# Patient Record
Sex: FEMALE | Race: BLACK OR AFRICAN AMERICAN | Employment: FULL TIME | URBAN - METROPOLITAN AREA
[De-identification: names, ages, dates, MRNs, and addresses within clinical notes are randomized per-mention and may not be internally consistent; named-entity substitution may affect disease eponyms.]

---

## 2017-05-04 ENCOUNTER — OFFICE VISIT (OUTPATIENT)
Dept: OBGYN CLINIC | Facility: CLINIC | Age: 37
End: 2017-05-04

## 2017-05-04 VITALS
HEIGHT: 63 IN | DIASTOLIC BLOOD PRESSURE: 80 MMHG | WEIGHT: 237 LBS | BODY MASS INDEX: 41.99 KG/M2 | SYSTOLIC BLOOD PRESSURE: 120 MMHG

## 2017-05-04 DIAGNOSIS — Z01.419 WOMEN'S ANNUAL ROUTINE GYNECOLOGICAL EXAMINATION: ICD-10-CM

## 2017-05-04 DIAGNOSIS — Z12.4 ROUTINE CERVICAL SMEAR: Primary | ICD-10-CM

## 2017-05-04 PROCEDURE — 87624 HPV HI-RISK TYP POOLED RSLT: CPT | Performed by: OBSTETRICS & GYNECOLOGY

## 2017-05-04 PROCEDURE — 88175 CYTOPATH C/V AUTO FLUID REDO: CPT | Performed by: OBSTETRICS & GYNECOLOGY

## 2017-05-04 PROCEDURE — 99395 PREV VISIT EST AGE 18-39: CPT | Performed by: OBSTETRICS & GYNECOLOGY

## 2017-05-04 NOTE — PROGRESS NOTES
GYN H&P     2017  12:30 PM    CC:Patient presents for routine visit    HPI: Patient is a 39year old  here for annual gyn exam and PAP. Cycles regular. No pelvic pain. No abnormal vaginal discharge or bleeding.        Patient's last menstrual per Drug Use: No    Sexual Activity: Yes     Other Topics Concern   None on file     Social History Narrative       ROS:     Review of Systems:  General: no complaints  Eyes: no complaints  Respiratory: no complaints  Cardiovascular: no complaints  GI: denies

## 2019-05-15 ENCOUNTER — OFFICE VISIT (OUTPATIENT)
Dept: OBGYN CLINIC | Facility: CLINIC | Age: 39
End: 2019-05-15
Payer: COMMERCIAL

## 2019-05-15 VITALS
DIASTOLIC BLOOD PRESSURE: 70 MMHG | SYSTOLIC BLOOD PRESSURE: 118 MMHG | BODY MASS INDEX: 46.84 KG/M2 | WEIGHT: 264.38 LBS | HEIGHT: 63 IN

## 2019-05-15 DIAGNOSIS — Z01.419 WOMEN'S ANNUAL ROUTINE GYNECOLOGICAL EXAMINATION: Primary | ICD-10-CM

## 2019-05-15 DIAGNOSIS — E66.01 OBESITY, CLASS III, BMI 40-49.9 (MORBID OBESITY) (HCC): ICD-10-CM

## 2019-05-15 DIAGNOSIS — Z12.4 SCREENING FOR MALIGNANT NEOPLASM OF THE CERVIX: ICD-10-CM

## 2019-05-15 PROCEDURE — 87624 HPV HI-RISK TYP POOLED RSLT: CPT | Performed by: OBSTETRICS & GYNECOLOGY

## 2019-05-15 PROCEDURE — 99395 PREV VISIT EST AGE 18-39: CPT | Performed by: OBSTETRICS & GYNECOLOGY

## 2019-05-15 NOTE — PROGRESS NOTES
GYN ANNUAL    5/15/2019  12:04 PM    CC:Patient presents for exam    HPI: Patient is a 43year old  LMP 2019 here for annual gyn exam and PAP. Cycles stable. No pelvic pain. No abnormal vaginal discharge or bleeding.  Weight gain has continued - Smoker      Smokeless tobacco: Never Used    Substance and Sexual Activity      Alcohol use:  Yes        Alcohol/week: 0.0 oz      Drug use: No      Sexual activity: Yes    Social History    Social History Narrative      Not on file      ROS:     Review of

## 2019-07-04 ENCOUNTER — HOSPITAL ENCOUNTER (EMERGENCY)
Facility: HOSPITAL | Age: 39
Discharge: HOME | End: 2019-07-04
Payer: COMMERCIAL

## 2019-07-04 VITALS
WEIGHT: 251.31 LBS | HEIGHT: 63 IN | RESPIRATION RATE: 16 BRPM | SYSTOLIC BLOOD PRESSURE: 112 MMHG | OXYGEN SATURATION: 100 % | BODY MASS INDEX: 44.53 KG/M2 | TEMPERATURE: 99 F | DIASTOLIC BLOOD PRESSURE: 67 MMHG | HEART RATE: 94 BPM

## 2019-07-04 DIAGNOSIS — S39.012A LUMBAR STRAIN, INITIAL ENCOUNTER: ICD-10-CM

## 2019-07-04 DIAGNOSIS — M54.16 LUMBAR RADICULOPATHY: Primary | ICD-10-CM

## 2019-07-04 LAB
B-HCG UR QL: NEGATIVE
BILIRUB UR QL: NEGATIVE
CLARITY UR: CLEAR
COLOR UR: YELLOW
GLUCOSE UR-MCNC: NEGATIVE MG/DL
LEUKOCYTE ESTERASE UR QL STRIP.AUTO: NEGATIVE
NITRITE UR QL STRIP.AUTO: NEGATIVE
PH UR: 5 [PH] (ref 5–8)
PROT UR-MCNC: NEGATIVE MG/DL
RBC #/AREA URNS AUTO: 1 /HPF
SP GR UR STRIP: 1.01 (ref 1–1.03)
UROBILINOGEN UR STRIP-ACNC: <2
VIT C UR-MCNC: NEGATIVE MG/DL
WBC #/AREA URNS AUTO: 1 /HPF

## 2019-07-04 PROCEDURE — 81001 URINALYSIS AUTO W/SCOPE: CPT | Performed by: NURSE PRACTITIONER

## 2019-07-04 PROCEDURE — 81025 URINE PREGNANCY TEST: CPT

## 2019-07-04 PROCEDURE — 99283 EMERGENCY DEPT VISIT LOW MDM: CPT

## 2019-07-04 RX ORDER — PREDNISONE 20 MG/1
60 TABLET ORAL ONCE
Status: COMPLETED | OUTPATIENT
Start: 2019-07-04 | End: 2019-07-04

## 2019-07-04 RX ORDER — CYCLOBENZAPRINE HCL 10 MG
10 TABLET ORAL 3 TIMES DAILY PRN
Qty: 15 TABLET | Refills: 0 | Status: SHIPPED | OUTPATIENT
Start: 2019-07-04 | End: 2019-07-08

## 2019-07-04 RX ORDER — CYCLOBENZAPRINE HCL 10 MG
10 TABLET ORAL ONCE
Status: COMPLETED | OUTPATIENT
Start: 2019-07-04 | End: 2019-07-04

## 2019-07-04 RX ORDER — PREDNISONE 20 MG/1
60 TABLET ORAL DAILY
Qty: 12 TABLET | Refills: 0 | Status: SHIPPED | OUTPATIENT
Start: 2019-07-04 | End: 2019-07-08

## 2019-07-04 NOTE — ED INITIAL ASSESSMENT (HPI)
Patient presents to ED with complaint of back pain after hurting herself while cleaning today. No acute distress noted.

## 2019-07-04 NOTE — ED PROVIDER NOTES
Patient Seen in: [facility] AND [facility] Emergency Department    History   CC: back pain  HPI: Abena Yeung 43year old female  who presents to the ER c/o right-sided lower lumbar back pain that radiates into her right buttocks and proximal leg sta reviewed and negative except as noted above. PSFH elements reviewed from today and agreed except as otherwise stated in HPI. Medications :   predniSONE 20 MG Oral Tab,  Take 3 tablets (60 mg total) by mouth daily for 4 days.    Cyclobenzaprine HCl 10 straight leg raise strength equal bilat, pedal pulses 2+ bilat.   no midline spinal tenderness or obvious sign of trauma/swelling/deformity, +flexion of the 1st and 5th toes bilat.  + Right-sided lower lumbar musculature tenderness into the right gluteus ma

## 2019-07-04 NOTE — ED NOTES
Having pain right flank/back with radiation to left hip upper thigh area. Denies numbness. no difficulty with urination.  Ambulating without difficulty

## 2024-01-11 ENCOUNTER — HOSPITAL ENCOUNTER (OUTPATIENT)
Age: 44
Discharge: HOME | End: 2024-01-11
Payer: COMMERCIAL

## 2024-01-11 ENCOUNTER — APPOINTMENT (OUTPATIENT)
Dept: GENERAL RADIOLOGY | Age: 44
End: 2024-01-11
Attending: PHYSICIAN ASSISTANT
Payer: COMMERCIAL

## 2024-01-11 VITALS
SYSTOLIC BLOOD PRESSURE: 171 MMHG | HEART RATE: 89 BPM | DIASTOLIC BLOOD PRESSURE: 93 MMHG | TEMPERATURE: 97 F | OXYGEN SATURATION: 100 % | RESPIRATION RATE: 20 BRPM

## 2024-01-11 DIAGNOSIS — M79.675 TOE PAIN, LEFT: Primary | ICD-10-CM

## 2024-01-11 PROCEDURE — 99203 OFFICE O/P NEW LOW 30 MIN: CPT

## 2024-01-11 PROCEDURE — 73660 X-RAY EXAM OF TOE(S): CPT | Performed by: PHYSICIAN ASSISTANT

## 2024-01-11 NOTE — ED PROVIDER NOTES
Patient Seen in: [facility] [place]      History     Chief Complaint   Patient presents with    Leg or Foot Injury     Entered by patient     Stated Complaint: Leg or Foot Injury    Subjective:   HPI    43-year-old female presenting with complaint of pain to the left great toe.  Injured the toe in a yoga class approximately 1 week ago.  There is no swelling, redness or bruising.  Patient has no history of gout.  She has been ambulatory    Objective:   Past Medical History:   Diagnosis Date    SHERLYN II (cervical intraepithelial neoplasia II)     LEEP     High grade squamous intraepithelial lesion of cervix     Human papilloma virus infection               Past Surgical History:   Procedure Laterality Date    ANESTHESIA FOR;  PROCEDURES  2006      2012      2015    COLPOSCOPY, CERVIX W/UPPER ADJACENT VAGINA; W/BIOPSY(S), CERVIX      LEEP      TUBAL LIGATION  2015                No pertinent social history.            Review of Systems    Positive for stated complaint: Leg or Foot Injury  Other systems are as noted in HPI.  Constitutional and vital signs reviewed.      All other systems reviewed and negative except as noted above.    Physical Exam     ED Triage Vitals [24 1119]   BP (!) 171/93   Pulse 89   Resp 20   Temp 97.4 °F (36.3 °C)   Temp src Temporal   SpO2 100 %   O2 Device None (Room air)       Current:BP (!) 171/93   Pulse 89   Temp 97.4 °F (36.3 °C) (Temporal)   Resp 20   SpO2 100%         Physical Exam  Vitals and nursing note reviewed.   Constitutional:       General: She is not in acute distress.  HENT:      Head: Normocephalic and atraumatic.      Right Ear: External ear normal.      Left Ear: External ear normal.      Nose: Nose normal.      Mouth/Throat:      Mouth: Mucous membranes are moist.   Eyes:      Extraocular Movements: Extraocular movements intact.      Pupils: Pupils are equal, round, and reactive to light.    Cardiovascular:      Rate and Rhythm: Normal rate.   Pulmonary:      Effort: Pulmonary effort is normal.   Abdominal:      General: Abdomen is flat.   Musculoskeletal:         General: Normal range of motion.      Cervical back: Normal range of motion.      Comments: There is no obvious swelling, bruising or redness to the left great toe   Skin:     General: Skin is warm.   Neurological:      General: No focal deficit present.      Mental Status: She is alert and oriented to person, place, and time.   Psychiatric:         Mood and Affect: Mood normal.         Behavior: Behavior normal.               ED Course   Labs Reviewed - No data to display     43-year-old female presenting for evaluation of pain to the left great toe after injuring it in yoga class    Differential diagnosis includes fracture, contusion or sprain  X-ray negative for fracture.  She is neurovascularly intact.  Discussed toe sprain precautions.  Podiatry follow-up if symptoms do not improve              MDM                                         Medical Decision Making      Disposition and Plan     Clinical Impression:  1. Toe pain, left         Disposition:  Discharge  1/11/2024 12:18 pm    Follow-up:  Yusef Villarreal, NATALI  [facility] [address]  [place] [address]  [phone]                Medications Prescribed:  There are no discharge medications for this patient.

## 2024-01-11 NOTE — ED INITIAL ASSESSMENT (HPI)
Patient went to yoga Saturday.  Reports during class she developed left great toe soreness to the bottom and it worsened this am.  + swelling.  Reports difficulty bearing weight due to pain.

## 2024-03-11 ENCOUNTER — ORDER TRANSCRIPTION (OUTPATIENT)
Dept: ADMINISTRATIVE | Facility: HOSPITAL | Age: 44
End: 2024-03-11

## 2024-03-11 DIAGNOSIS — Z12.31 VISIT FOR SCREENING MAMMOGRAM: Primary | ICD-10-CM

## 2024-04-12 ENCOUNTER — OFFICE VISIT (OUTPATIENT)
Dept: FAMILY MEDICINE CLINIC | Facility: CLINIC | Age: 44
End: 2024-04-12
Payer: COMMERCIAL

## 2024-04-12 VITALS
SYSTOLIC BLOOD PRESSURE: 132 MMHG | TEMPERATURE: 98 F | WEIGHT: 230 LBS | OXYGEN SATURATION: 99 % | HEIGHT: 63 IN | RESPIRATION RATE: 16 BRPM | HEART RATE: 103 BPM | BODY MASS INDEX: 40.75 KG/M2 | DIASTOLIC BLOOD PRESSURE: 84 MMHG

## 2024-04-12 DIAGNOSIS — S29.011A STRAIN OF RIGHT PECTORALIS MUSCLE, INITIAL ENCOUNTER: Primary | ICD-10-CM

## 2024-04-12 DIAGNOSIS — M62.838 MUSCLE SPASM: ICD-10-CM

## 2024-04-12 PROCEDURE — 99202 OFFICE O/P NEW SF 15 MIN: CPT | Performed by: NURSE PRACTITIONER

## 2024-04-12 RX ORDER — NAPROXEN 500 MG/1
500 TABLET ORAL 2 TIMES DAILY WITH MEALS
Qty: 20 TABLET | Refills: 0 | Status: SHIPPED | OUTPATIENT
Start: 2024-04-12 | End: 2024-04-22

## 2024-04-12 RX ORDER — CYCLOBENZAPRINE HCL 10 MG
10 TABLET ORAL 3 TIMES DAILY PRN
Qty: 30 TABLET | Refills: 0 | Status: SHIPPED | OUTPATIENT
Start: 2024-04-12

## 2024-04-12 NOTE — PROGRESS NOTES
Melinda Moyer is a 43 year old female.  HPI:   Patient presents with chief complaint of pain to right pectoral muscle for 3 days.         Cause - when sleeping, she turned in bed \"funny\" and area started to bother her.  She woke up with discomfort.   Yesterday, restrained area after getting into the back of a SUV.    Onset - 3 days ago  Location of pain - right pectoral area.    Pain described as sharp and achy. 8/10  Radiation - no radiation of pain  Pain is aggravated by turning her head to the right, leaning forward and moving.    Symptoms associated with pain include pain to right pectoral and right upper back  Denies tingling, weakness, or numbness in shoulder, arm, or hand  Any prior injury to shoulder- denies  Prior surgeries to shoulder - denies  Treatments tried: ibuprofen, ice wraps, vicks, huy tea rest with some relief.       No current outpatient medications on file.      Past Medical History:    SHERLYN II (cervical intraepithelial neoplasia II)    LEEP 2006    High grade squamous intraepithelial lesion of cervix    Human papilloma virus infection      Social History:  Social History     Socioeconomic History    Marital status:    Tobacco Use    Smoking status: Never    Smokeless tobacco: Never   Substance and Sexual Activity    Alcohol use: Yes     Alcohol/week: 0.0 standard drinks of alcohol    Drug use: No    Sexual activity: Yes     Social Determinants of Health      Received from [facility], [facility]    Social Connections    Received from [facility], [facility]    Housing Stability        REVIEW OF SYSTEMS:   GENERAL HEALTH: feels well otherwise  SKIN: denies any unusual skin lesions or rashes  RESPIRATORY: denies shortness of breath with exertion  CARDIOVASCULAR: denies chest pain on exertion  GI: denies abdominal pain and denies heartburn  NEURO: denies headaches    EXAM:   /84   Pulse 103    Temp 97.9 °F (36.6 °C) (Temporal)   Resp 16   Ht 5' 3\" (1.6 m)   Wt 230 lb (104.3 kg)   LMP 03/25/2024   SpO2 99%   BMI 40.74 kg/m²   GENERAL: well developed, well nourished,in no apparent distress  SKIN: no rashes,no suspicious lesions  HEAD: atraumatic, normocephalic   NECK: supple,no adenopathy,no bruits  LUNGS: clear to auscultation  CARDIO: RRR without murmur  : no CVA tenderness    Right pectoral area:     Normal inspection with no shoulder deformity or skin changes.   No crepitus,  No swelling, No redness or warmth. + point tenderness at right pectoral minor area with palpation.  Muscle tightness to right pectoral muscle, right trapeze, and right rhomboid area.  Normal bilateral strength.  No scapula tenderness.     ROM: Normal.  Some discomfort with right neck movement.    Vascular: + normal pulses        ASSESSMENT AND PLAN:     Assessment:   Encounter Diagnoses   Name Primary?    Strain of right pectoralis muscle, initial encounter Yes    Muscle spasm        Plan:        1. Strain of right pectoralis muscle, initial encounter  2. Muscle spasm        Conservative measures discussed including icy hot or muscle creams  Ice or heat as needed     Recommend Naproxen 500 mg one tab BID x 10 days with meals. Risks, benefits, complications and side effects of meds discussed.  Will try flexeril for muscle spasms as prescribed   Avoid heavy lifting   Gentle stretching to area after symptoms start to improve.       Recommend follow up in 7-10 days if not improving or worsens.    Pt verbalizes understanding of  plan          Meds & Refills for this Visit:  Requested Prescriptions     Signed Prescriptions Disp Refills    naproxen 500 MG Oral Tab 20 tablet 0     Sig: Take 1 tablet (500 mg total) by mouth 2 (two) times daily with meals for 10 days.    cyclobenzaprine 10 MG Oral Tab 30 tablet 0     Sig: Take 1 tablet (10 mg total) by mouth 3 (three) times daily as needed for Muscle spasms. No driving, no alcohol,  no heavy machinery.         Patient Instructions   Avoid heavy lifting   May try ice or heat.  Heat is likely better to relax muscle  Icy hot or sports creams   Gentle stretching to area after symptoms start to improve.   Naproxen as prescribed with food  Flexeril as prescribed.  No driving, heavy machinery or alcohol on medication.       Recommend follow up in 7-10 days if not improving or worsens.    Pt verbalizes understanding of  plan             Infliximab Counseling:  I discussed with the patient the risks of infliximab including but not limited to myelosuppression, immunosuppression, autoimmune hepatitis, demyelinating diseases, lymphoma, and serious infections.  The patient understands that monitoring is required including a PPD at baseline and must alert us or the primary physician if symptoms of infection or other concerning signs are noted.

## 2024-04-12 NOTE — PATIENT INSTRUCTIONS
Avoid heavy lifting   May try ice or heat.  Heat is likely better to relax muscle  Icy hot or sports creams   Gentle stretching to area after symptoms start to improve.   Naproxen as prescribed with food  Flexeril as prescribed.  No driving, heavy machinery or alcohol on medication.       Recommend follow up in 7-10 days if not improving or worsens.    Pt verbalizes understanding of  plan

## 2024-05-08 ENCOUNTER — HOSPITAL ENCOUNTER (OUTPATIENT)
Dept: MAMMOGRAPHY | Age: 44
Discharge: HOME | End: 2024-05-08
Attending: OBSTETRICS & GYNECOLOGY
Payer: COMMERCIAL

## 2024-05-08 ENCOUNTER — OFFICE VISIT (OUTPATIENT)
Dept: OBGYN CLINIC | Facility: CLINIC | Age: 44
End: 2024-05-08
Payer: COMMERCIAL

## 2024-05-08 VITALS
HEIGHT: 63 IN | BODY MASS INDEX: 41.64 KG/M2 | DIASTOLIC BLOOD PRESSURE: 70 MMHG | SYSTOLIC BLOOD PRESSURE: 124 MMHG | WEIGHT: 235 LBS

## 2024-05-08 DIAGNOSIS — Z01.419 WOMEN'S ANNUAL ROUTINE GYNECOLOGICAL EXAMINATION: ICD-10-CM

## 2024-05-08 DIAGNOSIS — Z12.31 VISIT FOR SCREENING MAMMOGRAM: ICD-10-CM

## 2024-05-08 DIAGNOSIS — Z12.4 SCREENING FOR CERVICAL CANCER: Primary | ICD-10-CM

## 2024-05-08 PROCEDURE — 99386 PREV VISIT NEW AGE 40-64: CPT | Performed by: OBSTETRICS & GYNECOLOGY

## 2024-05-08 PROCEDURE — 88175 CYTOPATH C/V AUTO FLUID REDO: CPT | Performed by: OBSTETRICS & GYNECOLOGY

## 2024-05-08 PROCEDURE — 87624 HPV HI-RISK TYP POOLED RSLT: CPT | Performed by: OBSTETRICS & GYNECOLOGY

## 2024-05-08 PROCEDURE — 99459 PELVIC EXAMINATION: CPT | Performed by: OBSTETRICS & GYNECOLOGY

## 2024-05-08 PROCEDURE — 77063 BREAST TOMOSYNTHESIS BI: CPT | Performed by: OBSTETRICS & GYNECOLOGY

## 2024-05-08 PROCEDURE — 77067 SCR MAMMO BI INCL CAD: CPT | Performed by: OBSTETRICS & GYNECOLOGY

## 2024-05-08 NOTE — PROGRESS NOTES
NEW GYN H&P     2024  12:19 PM    Chief Complaint   Patient presents with    Annual     New (previous patient ) - Annual exam/pap    Mammogram Screening   .    HPI: Patient is a 43 year old  LMP 3/25/24 previous patient last seen 2019 here to re-establish care - due for annual exam, PAP, and mammogram. Past history of heavy cycles - currently lighter and regular. No gynecologic concerns or complaints.No pelvic pain. No abnormal vaginal discharge or bleeding.     Patient's last menstrual period was 2024.    OB History    Para Term  AB Living   3 2 2   1 2   SAB IAB Ectopic Multiple Live Births           2      # Outcome Date GA Lbr Armond/2nd Weight Sex Type Anes PTL Lv   3 Term 01/24/15 40w0d  7 lb (3.175 kg) F Caesarean EPI  ANDREEA      Complications: Tubal ligation evaluation   2 Term 12 39w0d  5 lb 12 oz (2.608 kg) M Caesarean EPI N ANDREEA      Complications: Preeclampsia   1 AB 2006               GYN hx:    Period Cycle (Days): EVERY MONTH  Period Duration (Days): 4 DAYS   Period Flow: HEAVY  Use of Birth Control (if yes, specify type): BTL   Hx Prior Abnormal Pap: Yes  Pap Date: 05/15/19  Pap Result Notes: wnl/-hpv  Follow Up Recommendation: last mammo;24  CONTRACEPTION: BTL  LAST MAMMOGRAM: today      Current Outpatient Medications   Medication Sig Dispense Refill    cyclobenzaprine 10 MG Oral Tab Take 1 tablet (10 mg total) by mouth 3 (three) times daily as needed for Muscle spasms. No driving, no alcohol, no heavy machinery. 30 tablet 0       Past Medical History:    SHERLYN II (cervical intraepithelial neoplasia II)    LEEP 2006    High grade squamous intraepithelial lesion of cervix    Human papilloma virus infection     Past Surgical History:   Procedure Laterality Date    Anesthesia for;  procedures  2006      2012      2015    Colposcopy, cervix w/upper adjacent vagina; w/biopsy(s), cervix      Leep  2006    Tubal ligation   01/24/2015     No Known Allergies  Family History   Problem Relation Age of Onset    Cancer Paternal Grandmother         LUNG CANCER    Cancer Paternal Grandfather         LUNG CANCER    Breast Cancer Paternal Aunt         post     Social History     Socioeconomic History    Marital status:    Tobacco Use    Smoking status: Never    Smokeless tobacco: Never   Substance and Sexual Activity    Alcohol use: Yes     Alcohol/week: 0.0 standard drinks of alcohol    Drug use: No    Sexual activity: Yes     Social History     Social History Narrative    Not on file       ROS:     Review of Systems:  A comprehensive 10 point ROS was completed. All pertinent positives and negatives noted in the HPI.     Ht 63\"   Wt 235 lb (106.6 kg)   LMP 03/25/2024   BMI 41.63 kg/m²     Exam:   GENERAL: well developed, well nourished, in no apparent distress  SKIN: no rashes, no lesions  HEENT: normal  LUNGS: respiration unlabored  CARDIOVASCULAR: no peripheral edema or varicosities, skin warm and dry  BREASTS: bilaterally nontender, no palpable masses, no nipple discharge, no skin changes, no axillary adenopathy  ABDOMEN: Soft, non distended; non tender, no masses  GYNE/:   External Genitalia: normal, no lesions, good perineal support  Urethra: meatus normal   Bladder: well supported  Vagina: normal mucosa, no lesions, no discharge   Uterus: normal size, mobile, nontender  Cervix: normal os, no lesions or bleeding  Adnexa:normal size, bilaterally nontender, no palpable masses  Cul-de-sac: normal  R/V: normal perineum, no hemorrhoids  EXTREMITIES:  nontender without edema      A/P: Patient is 43 year old female     1. Women's annual routine gynecological examination  - PAP today  - Return annually        5/8/2024  Kay Treviño MD

## 2024-07-02 ENCOUNTER — HOSPITAL ENCOUNTER (OUTPATIENT)
Dept: ULTRASOUND IMAGING | Facility: HOSPITAL | Age: 44
Discharge: HOME | End: 2024-07-02
Attending: OBSTETRICS & GYNECOLOGY
Payer: COMMERCIAL

## 2024-07-02 ENCOUNTER — HOSPITAL ENCOUNTER (OUTPATIENT)
Dept: MAMMOGRAPHY | Facility: HOSPITAL | Age: 44
Discharge: HOME | End: 2024-07-02
Attending: OBSTETRICS & GYNECOLOGY
Payer: COMMERCIAL

## 2024-07-02 DIAGNOSIS — R92.8 ABNORMAL MAMMOGRAM: ICD-10-CM

## 2024-07-02 PROCEDURE — 76642 ULTRASOUND BREAST LIMITED: CPT | Performed by: OBSTETRICS & GYNECOLOGY

## 2024-07-02 PROCEDURE — 77065 DX MAMMO INCL CAD UNI: CPT | Performed by: OBSTETRICS & GYNECOLOGY

## 2024-07-02 PROCEDURE — 77061 BREAST TOMOSYNTHESIS UNI: CPT | Performed by: OBSTETRICS & GYNECOLOGY

## 2024-07-02 NOTE — IMAGING NOTE
This nurse introduced self and role of breast coordinator.  Discussed recommended breast biopsy with patient. Pt was recommended by Dr. LUQUE to have a RIGHT breast, 2 SITES ultrasound guided biopsy.   Pt history discussed as below:  Patient has two children.   Pt history of biopsy:  cervical biopsy- 2006 squamous intraepithelial lesion      Family history of cancer:  paternal aunt- breast cancer; paternal grandmother- lung cancer  Pt history of breast cancer:   no  Hx BCP use:        no            HRT use:    no      Recommedations :       ULTRASOUND GUIDED RIGHT BREAST BIOPSY TWO SITES               see [facility] for dictated radiology report   Reviewed pertinent patient history, family history of cancer, and patient medications.  No current outpatient medications on file.        -All herbal supplements, Vitamin E, Fish Oil    -All NSAIDs (Ibuprofen, Motrin, Advil, Aleve, or other antiinflammatory medication)  and Aspirin  81mg currently being taken    not  recommended or prescribed by  your physician  should be held for 5  days prior to biopsy.  PATIENT DENIES USAGE   -Aspirin 81 mg being taken related to a cardiac condition  or prescribed by your  physician should be held at the  direction of your physician.  Informed patient to call ordering physician for guidelines   PATIENT DENIES USAGE    -Blood thinners/antiplatelet medications (Coumadin, Plavix ect) should be held at the  direction of your physician.  Informed patient to call ordering physician for guidelines   PATIENT DENIES USAGE   Reviewed US guided biopsy procedure, as below.  You will be lying on your back, potentially slightly toward one side, for this procedure.  The US technician will use the ultrasound machine to locate the area in question that was seen on your previous breast imaging.  The Radiologist will then inject a local numbing medication into the area. This may burn and sting for several seconds .  Then use a needle to collect cells or  tissue from the site.  A marker, or clip, will then be placed in the biopsied area.  This marker is placed so this biopsy site is able to be accurately located upon future breast imaging.  After the clip is placed, steri strips will be applied to  the biopsy site and should be kept on for 5 days.  Additional mammography films will then be taken to assure correct placement of the placed marker.    Educated the patient they will be awake during this procedure and are able to drive themselves home if they wish.  Educated patient that they should eat breakfast and park in green lot and check in with [facility] .  Educated patient that some soreness  may occur after biopsy.  Discussed use of a supportive bra and ice packs after procedure, to decrease soreness.  Tylenol only for discomfort unless they have an allergy to tylenol .  Discussed with patient no swimming, bathing,  hot tubs or submerging underwater  for 5 days post procedure until the incision is closed and healed.   Educated patient on lifting restrictions - nothing heavier than a gallon of milk for 24-48 hours after the procedure.      Discussed with patient that some soreness and bruising is normal after biopsy but that prolonged or increased pain and bruising should be reported to the ordering physician.   Educated patient that they should bring a sports bra or form fitting bra on day of procedure. Educated patient that this helps with comfort after the biopsy and decrease swelling.  Reviewed results process with patient and shared that pathology results will be available within 2-3 business days of their biopsy.  Discussed results will be communicated by their ordering physician unless otherwise indicated.  Educated patient that once we receive an order from her physician  our radiology secretaries would be calling   to schedule the biopsy.

## 2024-07-03 ENCOUNTER — TELEPHONE (OUTPATIENT)
Dept: OBGYN CLINIC | Facility: CLINIC | Age: 44
End: 2024-07-03

## 2024-07-03 DIAGNOSIS — N63.10 MASS OF RIGHT BREAST, UNSPECIFIED QUADRANT: Primary | ICD-10-CM

## 2024-07-03 NOTE — TELEPHONE ENCOUNTER
Received orders request for Ultrasound core biopsy to right breast 2 sites due to right breast masses    Order placed.

## 2024-07-05 ENCOUNTER — TELEPHONE (OUTPATIENT)
Dept: MAMMOGRAPHY | Facility: HOSPITAL | Age: 44
End: 2024-07-05

## 2024-07-08 ENCOUNTER — TELEPHONE (OUTPATIENT)
Dept: MAMMOGRAPHY | Facility: HOSPITAL | Age: 44
End: 2024-07-08

## 2024-07-09 ENCOUNTER — TELEPHONE (OUTPATIENT)
Dept: ULTRASOUND IMAGING | Facility: HOSPITAL | Age: 44
End: 2024-07-09

## 2024-07-19 ENCOUNTER — TELEPHONE (OUTPATIENT)
Dept: MAMMOGRAPHY | Facility: HOSPITAL | Age: 44
End: 2024-07-19

## 2024-07-26 ENCOUNTER — TELEPHONE (OUTPATIENT)
Dept: ULTRASOUND IMAGING | Facility: HOSPITAL | Age: 44
End: 2024-07-26

## 2024-07-26 NOTE — TELEPHONE ENCOUNTER
Follow up call to schedule  pt for Bx no answer I left a detail message to call me back at  if she wants to anthony Bx.

## 2024-08-05 ENCOUNTER — TELEPHONE (OUTPATIENT)
Dept: ULTRASOUND IMAGING | Facility: HOSPITAL | Age: 44
End: 2024-08-05

## 2024-08-05 ENCOUNTER — TELEPHONE (OUTPATIENT)
Dept: OBGYN CLINIC | Facility: CLINIC | Age: 44
End: 2024-08-05

## 2024-08-05 NOTE — TELEPHONE ENCOUNTER
Received call from Telma Rodriguez, has attempted to schedule pt for breast biopsy x3 but no response.        [facility]

## 2024-08-05 NOTE — TELEPHONE ENCOUNTER
Detail Level: Detailed I left a detail message to call me back at  to let  e know if she wanted to schedule her procedure awaiting call  back.

## 2024-08-05 NOTE — TELEPHONE ENCOUNTER
I called Dr Hobson' office and I spoke with Virgen Rn  informed multiple attempts to reach pt  have been unsuccessful in scheduling pt for Bx.I informed Virgen I would not be calling pt again but  I am requesting Dr Treviño to follow up with pt regarding Bx recommendations. Virgen to follow up with pt \"may need to send a certified letter to pt\" per Virgen.

## 2024-08-08 NOTE — TELEPHONE ENCOUNTER
Called pt states she did not give consent for clinic to call  (see release of verbal information, per consent may provide  with routine and sensitive information).  Pt request for Dr. Treviño to call her and pt does not wish to be called by this office, have certified letter sent (which it was already, informed to discard) and will not do the breast biopsy.

## 2024-08-09 ENCOUNTER — TELEPHONE (OUTPATIENT)
Dept: OBGYN CLINIC | Facility: CLINIC | Age: 44
End: 2024-08-09

## 2024-08-09 NOTE — TELEPHONE ENCOUNTER
Spoke with patient who was uncomfortable with treatment at [facility] where recommended having USN-guided breast biopsy. Prefers to go to [facility] for second opinion and where PCP is. Advised to proceed with appointment there.

## 2024-08-21 ENCOUNTER — TELEPHONE (OUTPATIENT)
Dept: OBGYN CLINIC | Facility: CLINIC | Age: 44
End: 2024-08-21

## 2025-01-17 ENCOUNTER — APPOINTMENT (OUTPATIENT)
Dept: GENERAL RADIOLOGY | Age: 45
End: 2025-01-17
Attending: PHYSICIAN ASSISTANT
Payer: COMMERCIAL

## 2025-01-17 ENCOUNTER — HOSPITAL ENCOUNTER (OUTPATIENT)
Age: 45
Discharge: HOME | End: 2025-01-17
Payer: COMMERCIAL

## 2025-01-17 VITALS
RESPIRATION RATE: 16 BRPM | DIASTOLIC BLOOD PRESSURE: 99 MMHG | HEART RATE: 93 BPM | TEMPERATURE: 98 F | SYSTOLIC BLOOD PRESSURE: 151 MMHG | OXYGEN SATURATION: 100 %

## 2025-01-17 DIAGNOSIS — R07.89 CHEST PAIN, ATYPICAL: Primary | ICD-10-CM

## 2025-01-17 DIAGNOSIS — J40 BRONCHITIS: ICD-10-CM

## 2025-01-17 DIAGNOSIS — H92.01 RIGHT EAR PAIN: ICD-10-CM

## 2025-01-17 DIAGNOSIS — H61.21 EXCESSIVE CERUMEN IN EAR CANAL, RIGHT: ICD-10-CM

## 2025-01-17 LAB
#MXD IC: 0.3 X10ˆ3/UL (ref 0.1–1)
BUN BLD-MCNC: 8 MG/DL (ref 7–18)
CHLORIDE BLD-SCNC: 102 MMOL/L (ref 98–112)
CO2 BLD-SCNC: 26 MMOL/L (ref 21–32)
CREAT BLD-MCNC: 0.7 MG/DL
DDIMER WHOLE BLOOD: 228 NG/ML DDU (ref ?–400)
EGFRCR SERPLBLD CKD-EPI 2021: 109 ML/MIN/1.73M2 (ref 60–?)
GLUCOSE BLD-MCNC: 96 MG/DL (ref 70–99)
HCT VFR BLD AUTO: 42.2 %
HCT VFR BLD CALC: 46 %
HGB BLD-MCNC: 13.6 G/DL
ISTAT IONIZED CALCIUM FOR CHEM 8: 1.2 MMOL/L (ref 1.12–1.32)
LYMPHOCYTES # BLD AUTO: 2.2 X10ˆ3/UL (ref 1–4)
LYMPHOCYTES NFR BLD AUTO: 31.1 %
MCH RBC QN AUTO: 27.5 PG (ref 26–34)
MCHC RBC AUTO-ENTMCNC: 32.2 G/DL (ref 31–37)
MCV RBC AUTO: 85.4 FL (ref 80–100)
MIXED CELL %: 4.7 %
NEUTROPHILS # BLD AUTO: 4.6 X10ˆ3/UL (ref 1.5–7.7)
NEUTROPHILS NFR BLD AUTO: 64.2 %
PLATELET # BLD AUTO: 242 X10ˆ3/UL (ref 150–450)
POTASSIUM BLD-SCNC: 3.9 MMOL/L (ref 3.6–5.1)
RBC # BLD AUTO: 4.94 X10ˆ6/UL
SODIUM BLD-SCNC: 138 MMOL/L (ref 136–145)
TROPONIN I BLD-MCNC: <0.02 NG/ML
WBC # BLD AUTO: 7.1 X10ˆ3/UL (ref 4–11)

## 2025-01-17 PROCEDURE — 85025 COMPLETE CBC W/AUTO DIFF WBC: CPT | Performed by: PHYSICIAN ASSISTANT

## 2025-01-17 PROCEDURE — 99215 OFFICE O/P EST HI 40 MIN: CPT

## 2025-01-17 PROCEDURE — 99214 OFFICE O/P EST MOD 30 MIN: CPT

## 2025-01-17 PROCEDURE — 93005 ELECTROCARDIOGRAM TRACING: CPT

## 2025-01-17 PROCEDURE — 36415 COLL VENOUS BLD VENIPUNCTURE: CPT

## 2025-01-17 PROCEDURE — 84484 ASSAY OF TROPONIN QUANT: CPT

## 2025-01-17 PROCEDURE — 85378 FIBRIN DEGRADE SEMIQUANT: CPT | Performed by: PHYSICIAN ASSISTANT

## 2025-01-17 PROCEDURE — 80047 BASIC METABLC PNL IONIZED CA: CPT

## 2025-01-17 PROCEDURE — 71046 X-RAY EXAM CHEST 2 VIEWS: CPT | Performed by: PHYSICIAN ASSISTANT

## 2025-01-17 RX ORDER — BENZONATATE 100 MG/1
100 CAPSULE ORAL 3 TIMES DAILY PRN
Qty: 20 CAPSULE | Refills: 0 | Status: SHIPPED | OUTPATIENT
Start: 2025-01-17 | End: 2025-01-24

## 2025-01-17 RX ORDER — AZITHROMYCIN 250 MG/1
TABLET, FILM COATED ORAL
Qty: 6 TABLET | Refills: 0 | Status: SHIPPED | OUTPATIENT
Start: 2025-01-17 | End: 2025-01-22

## 2025-01-17 NOTE — DISCHARGE INSTRUCTIONS
RE ADDRESS OUTPATIENT OVER WEEK AHEAD WITH PRIMARY CARE.     GO TO ER FOR BREAKTHROUGH CHANGES/CONCERNS BY INSTRUCTION.

## 2025-01-17 NOTE — ED INITIAL ASSESSMENT (HPI)
Patient reports not feeling well sine 12/28.  Intermittent ear pain since that time.  States last Friday she began to feel chest heaviness accompanied by back pain.  + headaches over the weekend.  Cough starting today.

## 2025-01-17 NOTE — ED PROVIDER NOTES
Chief Complaint   Patient presents with    Chest Pain     Had a ear ache last week now feel tightness is chest back feels a little heavy - Entered by patient       HPI:     Melinda Olivas is a 44 year old female who presents for evaluation of right ear pain dating back 3 weeks ago with improvement in ibuprofen without medical evaluation or recent antibiotic.  Notes mild generalized chest discomfort over the last week intermittently into the upper back.  Patient notes developing cough nonproductive within the last day.  Positive family history of PE DVT father.  Denies associated fever dizziness active ear pain sore throat dysphagia neck pain active chest pain shortness of breath abdominal pain vomiting diarrhea dysuria rash.      PFSH    PFSH asessment screens reviewed and agree.  Nurses notes reviewed I agree with documentation.    Family History   Problem Relation Age of Onset    Cancer Paternal Grandmother         LUNG CANCER    Cancer Paternal Grandfather         LUNG CANCER    Breast Cancer Paternal Aunt         post     Family history reviewed with patient/caregiver and is not pertinent to presenting problem.  Social History     Socioeconomic History    Marital status:      Spouse name: Not on file    Number of children: Not on file    Years of education: Not on file    Highest education level: Not on file   Occupational History    Not on file   Tobacco Use    Smoking status: Never    Smokeless tobacco: Never   Substance and Sexual Activity    Alcohol use: Yes     Alcohol/week: 0.0 standard drinks of alcohol    Drug use: No    Sexual activity: Yes   Other Topics Concern    Not on file   Social History Narrative    Not on file     Social Drivers of Health     Financial Resource Strain: Not on file   Food Insecurity: Not on file   Transportation Needs: Not on file   Physical Activity: Not on file   Stress: Not on file   Social Connections: Unknown (3/18/2021)    [facility], [facility]    Social Connections     Conversations with friends/family/neighbors per week: Not on file   Housing Stability: Low Risk  (7/7/2021)    Received from [facility], [facility]    Housing Stability     Mortgage Payment Concerns?: Not on file     Number of Places Lived in the Last Year: Not on file     Unstable Housing?: Not on file         ROS:   Positive for stated complaint: Ear pain chest tightness  All other systems reviewed and negative except as noted above.  Constitutional and Vital Signs Reviewed.      Physical Exam:     Findings:    BP (!) 151/99   Pulse 93   Temp 98.4 °F (36.9 °C) (Oral)   Resp 16   LMP 06/22/2024 (Exact Date)   SpO2 100%   GENERAL: well developed, well nourished, well hydrated, no distress  SKIN: good skin turgor, no obvious rashes  NECK: No nuchal rigidity or JVD.  Supple, no adenopathy  CARDIO: RRR without murmur  EXTREMITIES: no cyanosis or edema. COLLIER without difficulty  GI: soft, non-tender, normal bowel sounds  HEAD: normocephalic, atraumatic  EYES: sclera non icteric bilateral, conjunctiva clear  EARS: No external ear or mastoid tenderness bilaterally.  Excessive cerumen along the right canal.  TM intact.  No gross erythema or effusion.  Left canal unremarkable.  NOSE: No rhinorrhea.  MMM nasal turbinates: pink, normal mucosa  THROAT: clear, without exudates, uvula midline, and airway patent  LUNGS: No retractions.  Clear to auscultation bilaterally; no rales, rhonchi, or wheezes  NEURO: No focal deficits  PSYCH: Alert and oriented x3.  Answering questions appropriately.  Mood appropriate.    MDM/Assessment/Plan:   Orders for this encounter:    Orders Placed This Encounter    XR CHEST PA + LAT CHEST (AFS=[id number])     Order Specific Question:   What is the Relevant Clinical Indication / Reason for Exam?     Answer:   Chest Pain - Had a ear ache last week now feel tightness is chest back feels a  little heavy     Order Specific Question:   Release to patient     Answer:   Immediate    D-Dimer,Triage     Order Specific Question:   Release to patient     Answer:   Immediate    POCT CBC     Order Specific Question:   Release to patient     Answer:   Immediate    EKG 12 Lead     Order Specific Question:   Release to patient     Answer:   Immediate    POCT ISTAT chem8 cartridge    ISTAT Troponin    iStat (Troponin)    iStat (Chem 8)    azithromycin (ZITHROMAX Z-ALEXANDREA) 250 MG Oral Tab     Si mg once followed by 250 mg daily x 4 days     Dispense:  6 tablet     Refill:  0    benzonatate 100 MG Oral Cap     Sig: Take 1 capsule (100 mg total) by mouth 3 (three) times daily as needed for cough.     Dispense:  20 capsule     Refill:  0    carbamide peroxide 6.5 % Otic Solution     Sig: Place 5 drops into the right ear 2 (two) times daily for 7 days.     Dispense:  15 mL     Refill:  0       Labs performed this visit:  Recent Results (from the past 10 hours)   EKG 12 Lead    Collection Time: 25 11:21 AM   Result Value Ref Range    Ventricular rate 91 BPM    Atrial rate 91 BPM    P-R Interval 162 ms    QRS Duration 82 ms    Q-T Interval 354 ms    QTC Calculation (Bezet) 435 ms    P Axis 53 degrees    R Axis 64 degrees    T Axis 5 degrees   D-Dimer,Triage    Collection Time: 25 11:30 AM   Result Value Ref Range    D-Dimer  <400 ng/mL DDU   POCT CBC    Collection Time: 25 11:30 AM   Result Value Ref Range    WBC IC 7.1 4.0 - 11.0 x10ˆ3/uL    RBC IC 4.94 3.80 - 5.30 X10ˆ6/uL    HGB IC 13.6 12.0 - 16.0 g/dL    HCT IC 42.2 35.0 - 48.0 %    MCV IC 85.4 80.0 - 100.0 fL    MCH IC 27.5 26.0 - 34.0 pg    MCHC IC 32.2 31.0 - 37.0 g/dL    PLT .0 150.0 - 450.0 X10ˆ3/uL    # Neutrophil 4.6 1.5 - 7.7 X10ˆ3/uL    # Lymphocyte 2.2 1.0 - 4.0 X10ˆ3/uL    # Mixed Cells 0.3 0.1 - 1.0 X10ˆ3/uL    Neutrophil % 64.2 %    Lymphocyte % 31.1 %    Mixed Cell % 4.7 %   POCT ISTAT chem8 cartridge    Collection Time:  01/17/25 11:43 AM   Result Value Ref Range    ISTAT Sodium 138 136 - 145 mmol/L    ISTAT BUN 8 7 - 18 mg/dL    ISTAT Potassium 3.9 3.6 - 5.1 mmol/L    ISTAT Chloride 102 98 - 112 mmol/L    ISTAT Ionized Calcium 1.20 1.12 - 1.32 mmol/L    ISTAT Hematocrit 46 34 - 50 %    ISTAT Glucose 96 70 - 99 mg/dL    ISTAT TCO2 26 21 - 32 mmol/L    ISTAT Creatinine 0.70 0.55 - 1.02 mg/dL    eGFR-Cr 109 >=60 mL/min/1.73m2   ISTAT Troponin    Collection Time: 01/17/25 11:53 AM   Result Value Ref Range    ISTAT Troponin <0.02 <0.045 ng/mL ng/mL       MDM:  Patient PERC negative however based on family history agreeable to further workup.    Patient metabolic and cardiac profile unremarkable, heart score: 1.  Dimer negative, chest x-ray without infiltrate or cardiomegaly.  Patient agreeable with support of cough and congestion they are requesting empiric antibiotic for potential bacterial although instructed likely viral process.  Will provide azithromycin as well as Debrox drops for cerumen along the right canal.  Happy with plan of care, instructed on indications readdress outpatient versus emergently.  Dr. Manzanares notified.    Diagnosis:    ICD-10-CM    1. Chest pain, atypical  R07.89       2. Right ear pain  H92.01       3. Excessive cerumen in ear canal, right  H61.21       4. Bronchitis  J40           All results reviewed and discussed with patient.  See AVS for detailed discharge instructions for your condition today.    Follow Up with:  Beatrice Andrea MD  [address] [facility]  [address]  [facility] [address] [phone]    Schedule an appointment as soon as possible for a visit in 3 days  follow up GO TO ER FOR BREAKTHROUGH CONCERNS.

## 2025-01-18 LAB
ATRIAL RATE: 91 BPM
P AXIS: 53 DEGREES
P-R INTERVAL: 162 MS
Q-T INTERVAL: 354 MS
QRS DURATION: 82 MS
QTC CALCULATION (BEZET): 435 MS
R AXIS: 64 DEGREES
T AXIS: 5 DEGREES
VENTRICULAR RATE: 91 BPM

## 2025-03-10 ENCOUNTER — TELEPHONE (OUTPATIENT)
Dept: FAMILY MEDICINE CLINIC | Facility: CLINIC | Age: 45
End: 2025-03-10

## 2025-03-10 ENCOUNTER — HOSPITAL ENCOUNTER (OUTPATIENT)
Age: 45
Discharge: HOME | End: 2025-03-10
Payer: COMMERCIAL

## 2025-03-10 ENCOUNTER — APPOINTMENT (OUTPATIENT)
Dept: GENERAL RADIOLOGY | Age: 45
End: 2025-03-10
Attending: PHYSICIAN ASSISTANT
Payer: COMMERCIAL

## 2025-03-10 VITALS
RESPIRATION RATE: 18 BRPM | SYSTOLIC BLOOD PRESSURE: 159 MMHG | OXYGEN SATURATION: 96 % | TEMPERATURE: 98 F | HEART RATE: 87 BPM | DIASTOLIC BLOOD PRESSURE: 96 MMHG

## 2025-03-10 DIAGNOSIS — M79.2 RADICULAR PAIN IN LEFT ARM: Primary | ICD-10-CM

## 2025-03-10 DIAGNOSIS — R07.89 CHEST PAIN, ATYPICAL: ICD-10-CM

## 2025-03-10 LAB
#MXD IC: 0.3 X10ˆ3/UL (ref 0.1–1)
ATRIAL RATE: 73 BPM
BUN BLD-MCNC: 11 MG/DL (ref 7–18)
CHLORIDE BLD-SCNC: 104 MMOL/L (ref 98–112)
CO2 BLD-SCNC: 24 MMOL/L (ref 21–32)
CREAT BLD-MCNC: 0.8 MG/DL
EGFRCR SERPLBLD CKD-EPI 2021: 93 ML/MIN/1.73M2 (ref 60–?)
GLUCOSE BLD-MCNC: 95 MG/DL (ref 70–99)
HCT VFR BLD AUTO: 40.3 %
HCT VFR BLD CALC: 43 %
HGB BLD-MCNC: 13 G/DL
ISTAT IONIZED CALCIUM FOR CHEM 8: 1.17 MMOL/L (ref 1.12–1.32)
LYMPHOCYTES # BLD AUTO: 2.2 X10ˆ3/UL (ref 1–4)
LYMPHOCYTES NFR BLD AUTO: 36.2 %
MCH RBC QN AUTO: 27.7 PG (ref 26–34)
MCHC RBC AUTO-ENTMCNC: 32.3 G/DL (ref 31–37)
MCV RBC AUTO: 85.7 FL (ref 80–100)
MIXED CELL %: 4.6 %
NEUTROPHILS # BLD AUTO: 3.5 X10ˆ3/UL (ref 1.5–7.7)
NEUTROPHILS NFR BLD AUTO: 59.2 %
P AXIS: 61 DEGREES
P-R INTERVAL: 126 MS
PLATELET # BLD AUTO: 235 X10ˆ3/UL (ref 150–450)
POTASSIUM BLD-SCNC: 3.7 MMOL/L (ref 3.6–5.1)
Q-T INTERVAL: 384 MS
QRS DURATION: 86 MS
QTC CALCULATION (BEZET): 423 MS
R AXIS: 55 DEGREES
RBC # BLD AUTO: 4.7 X10ˆ6/UL
SODIUM BLD-SCNC: 138 MMOL/L (ref 136–145)
T AXIS: 18 DEGREES
TROPONIN I BLD-MCNC: <0.02 NG/ML
VENTRICULAR RATE: 73 BPM
WBC # BLD AUTO: 6 X10ˆ3/UL (ref 4–11)

## 2025-03-10 PROCEDURE — 99215 OFFICE O/P EST HI 40 MIN: CPT

## 2025-03-10 PROCEDURE — 93010 ELECTROCARDIOGRAM REPORT: CPT

## 2025-03-10 PROCEDURE — 80047 BASIC METABLC PNL IONIZED CA: CPT

## 2025-03-10 PROCEDURE — 36415 COLL VENOUS BLD VENIPUNCTURE: CPT

## 2025-03-10 PROCEDURE — 72040 X-RAY EXAM NECK SPINE 2-3 VW: CPT | Performed by: PHYSICIAN ASSISTANT

## 2025-03-10 PROCEDURE — 93005 ELECTROCARDIOGRAM TRACING: CPT

## 2025-03-10 PROCEDURE — 85025 COMPLETE CBC W/AUTO DIFF WBC: CPT | Performed by: PHYSICIAN ASSISTANT

## 2025-03-10 PROCEDURE — 84484 ASSAY OF TROPONIN QUANT: CPT

## 2025-03-10 PROCEDURE — 71046 X-RAY EXAM CHEST 2 VIEWS: CPT | Performed by: PHYSICIAN ASSISTANT

## 2025-03-10 PROCEDURE — 99214 OFFICE O/P EST MOD 30 MIN: CPT

## 2025-03-10 RX ORDER — METHYLPREDNISOLONE 4 MG/1
TABLET ORAL
Qty: 1 EACH | Refills: 0 | Status: SHIPPED | OUTPATIENT
Start: 2025-03-10

## 2025-03-10 NOTE — ED INITIAL ASSESSMENT (HPI)
Patient arrived ambulatory to room c/o symptoms that started 6 days ago. +intermittent left sided chest pain radiating to the left arm. Patient states she took a muscle relaxer which helped with the pain. Was moving things yesterday and pain worsened. No SOB. +palpitations intermittent over the past 2 days no n/v. Easy non labored respirations. No distress.

## 2025-03-10 NOTE — TELEPHONE ENCOUNTER
Telephone encounter placed to review on Highway visit notes for complaints of chest wall/muscular pain, rapid heart rate, and pain between shoulder blades.  The limitations of the walk-in clinic were reviewed.  Patient stated that she thought she was making an appointment at the [facility] and scheduled the [facility] walk-in clinic and here.  Patient prefers to report to [facility].  Reviewed the patient may be sent to a higher level of care such as [place] [facility] or [facility] if needed.  Patient verbalized understanding and agrees with plan.

## 2025-03-10 NOTE — ED PROVIDER NOTES
Chief Complaint   Patient presents with    Chest Pain     Pain in griffin area and around shoulder blade area. Has some off and on faster heart beats yesterday - Entered by patient       HPI:     Melinda Olivas is a 44 year old female who presents for evaluation of left-sided chest pain over the last 6 days, notes pain radiates into the left shoulder and upper arm with paresthesias into the thumb.  Denies history of cervical disc disease or pinched nerve history.  Taking Advil with mild improvement.  Notes periodic palpitations over the last few days coinciding.  Denies history of cardiopulmonary issues or birth control use or hypercoagulable state.  Patient notes pain is worse with certain movements and improves with putting hand above head.  Denies associated headache dizziness blurred vision photophobia chest pain shortness of breath abdominal pain vomiting diarrhea dysuria rash.      PFSH    PFSH asessment screens reviewed and agree.  Nurses notes reviewed I agree with documentation.    Family History   Problem Relation Age of Onset    Cancer Paternal Grandmother         LUNG CANCER    Cancer Paternal Grandfather         LUNG CANCER    Breast Cancer Paternal Aunt         post     Family history reviewed with patient/caregiver and is not pertinent to presenting problem.  Social History     Socioeconomic History    Marital status:      Spouse name: Not on file    Number of children: Not on file    Years of education: Not on file    Highest education level: Not on file   Occupational History    Not on file   Tobacco Use    Smoking status: Never    Smokeless tobacco: Never   Substance and Sexual Activity    Alcohol use: Yes     Alcohol/week: 0.0 standard drinks of alcohol    Drug use: No    Sexual activity: Yes   Other Topics Concern    Not on file   Social History Narrative    Not on file     Social Drivers of Health     Food Insecurity: Not on file   Transportation Needs: Not on file   Housing  Stability: Low Risk  (7/7/2021)    [facility]    Housing Stability     Mortgage Payment Concerns?: Not on file     Number of Places Lived in the Last Year: Not on file     Unstable Housing?: Not on file         ROS:   Positive for stated complaint: Chest pain arm pain.  All other systems reviewed and negative except as noted above.  Constitutional and Vital Signs Reviewed.      Physical Exam:     Findings:    BP (!) 159/96   Pulse 87   Temp 98.4 °F (36.9 °C) (Oral)   Resp 18   LMP 06/22/2024 (Exact Date)   SpO2 96%   GENERAL: well developed, well nourished, well hydrated, no distress  SKIN: good skin turgor, no obvious rashes  NECK: No cervical or paracervical tenderness.  Supple, no adenopathy  CARDIO: RRR without murmur  EXTREMITIES: 2 point discrimination left upper extremity intact.  Normal abduction of the left upper extremity.  Radial pulse cap refill and distal sensation intact.    COLLIER without difficulty  GI: soft, non-tender, normal bowel sounds  HEAD: normocephalic, atraumatic  EYES: sclera non icteric bilateral, conjunctiva clear  EARS: TMs clear bilaterally. Canals clear.  NOSE: nasal turbinates: pink, normal mucosa  THROAT: clear, without exudates, uvula midline, and airway patent  LUNGS: clear to auscultation bilaterally; no rales, rhonchi, or wheezes  NEURO: No focal deficits  PSYCH: Alert and oriented x3.  Answering questions appropriately.  Mood appropriate.    MDM/Assessment/Plan:   Orders for this encounter:    Orders Placed This Encounter    XR CHEST PA + LAT CHEST (YBK=[id number])     Order Specific Question:   What is the Relevant Clinical Indication / Reason for Exam?     Answer:   CHEST PAIN, PALPITATIONS.     Order Specific Question:   Release to patient     Answer:   Immediate    XR CERVICAL SPINE (2-3 VIEWS) (CPT=72040)     Order Specific Question:   What is the Relevant Clinical Indication / Reason for Exam?     Answer:    LEFT ARM PAIN, PARESTHESIAS TO THUMB     Order Specific Question:   Release to patient     Answer:   Immediate    POCT CBC     Order Specific Question:   Release to patient     Answer:   Immediate    EKG 12 Lead     Order Specific Question:   Release to patient     Answer:   Immediate    POCT ISTAT chem8 cartridge    ISTAT Troponin    iStat (Troponin)    iStat (Chem 8)    methylPREDNISolone (MEDROL) 4 MG Oral Tablet Therapy Pack     Sig: Dosepack: take as directed     Dispense:  1 each     Refill:  0       Labs performed this visit:  Recent Results (from the past 10 hours)   EKG 12 Lead    Collection Time: 03/10/25  1:19 PM   Result Value Ref Range    Ventricular rate 73 BPM    Atrial rate 73 BPM    P-R Interval 126 ms    QRS Duration 86 ms    Q-T Interval 384 ms    QTC Calculation (Bezet) 423 ms    P Axis 61 degrees    R Axis 55 degrees    T Axis 18 degrees   POCT CBC    Collection Time: 03/10/25  1:28 PM   Result Value Ref Range    WBC IC 6.0 4.0 - 11.0 x10ˆ3/uL    RBC IC 4.70 3.80 - 5.30 X10ˆ6/uL    HGB IC 13.0 12.0 - 16.0 g/dL    HCT IC 40.3 35.0 - 48.0 %    MCV IC 85.7 80.0 - 100.0 fL    MCH IC 27.7 26.0 - 34.0 pg    MCHC IC 32.3 31.0 - 37.0 g/dL    PLT .0 150.0 - 450.0 X10ˆ3/uL    # Neutrophil 3.5 1.5 - 7.7 X10ˆ3/uL    # Lymphocyte 2.2 1.0 - 4.0 X10ˆ3/uL    # Mixed Cells 0.3 0.1 - 1.0 X10ˆ3/uL    Neutrophil % 59.2 %    Lymphocyte % 36.2 %    Mixed Cell % 4.6 %   POCT ISTAT chem8 cartridge    Collection Time: 03/10/25  1:39 PM   Result Value Ref Range    ISTAT Sodium 138 136 - 145 mmol/L    ISTAT BUN 11 7 - 18 mg/dL    ISTAT Potassium 3.7 3.6 - 5.1 mmol/L    ISTAT Chloride 104 98 - 112 mmol/L    ISTAT Ionized Calcium 1.17 1.12 - 1.32 mmol/L    ISTAT Hematocrit 43 34 - 50 %    ISTAT Glucose 95 70 - 99 mg/dL    ISTAT TCO2 24 21 - 32 mmol/L    ISTAT Creatinine 0.80 0.55 - 1.02 mg/dL    eGFR-Cr 93 >=60 mL/min/1.73m2   ISTAT Troponin    Collection Time: 03/10/25  1:49 PM   Result Value Ref Range    ISTAT  Troponin <0.02 <0.045 ng/mL ng/mL       MDM:  EKG 73 bpm normal sinus rhythm no ST elevation or depression with a QT of 384.    PERC negative.    Patient metabolic profile as well as troponin within normal limits, chest x-ray without cardiomegaly or effusion.  Cervical x-ray unremarkable, examination most effective of muscle strain versus pinched nerve issue potentially extending from cervical spine without focal concern.  Agrees with corticosteroid Medrol pack for support and to follow-up through primary care as well as cardiology based on previous evaluation of chest pain without clinical confidence as well as palpitation history noted subjectively.  Heart score: 2    Educated on home care follow-up as well as indications to go to the ER.  Dr. Siegel notified.    Diagnosis:    ICD-10-CM    1. Radicular pain in left arm  M79.2       2. Chest pain, atypical  R07.89           All results reviewed and discussed with patient.  See AVS for detailed discharge instructions for your condition today.    Follow Up with:  Luciano Ferrera, DO  [address] [facility]  [address]  [facility] [address]  [phone]    Schedule an appointment as soon as possible for a visit in 1 week  CARDIOLOGY REFERRAL    Kenneth Maurer MD  [address] [place] [address]  [address]  [facility] [address]  [phone]    Schedule an appointment as soon as possible for a visit   PRIMARY CARE REFERRAL

## (undated) NOTE — ED AVS SNAPSHOT
Esteban Xavier   MRN: [id number]    Department:  [facility] Emergency Department   Date of Visit:  7/4/2019           Disclosure     Insurance plans vary and the physician(s) referred by the ER may not be covered by your plan.  Please CARE PHYSICIAN AT ONCE OR RETURN IMMEDIATELY TO THE EMERGENCY DEPARTMENT. If you have been prescribed any medication(s), please fill your prescription right away and begin taking the medication(s) as directed.   If you believe that any of the medications

## (undated) NOTE — LETTER
05/20/19    To: Franchesca Barr    Re: Test Results      Thank you for your recent visit to our office. We have received your test results, which were done on 05/15/2019.      PAP Test:    Your results are normal.        HPV (Human papillomavirus

## (undated) NOTE — MR AVS SNAPSHOT
[address] at [address], [place] [address]  [phone]               Thank you for choosing us for your health care visit with Tim Schultz MD.  We are glad to serve you and happy to provide you wi your Zip Code and Date of Birth to complete the sign-up process. If you do not sign up before the expiration date, you must request a new code.     Your unique [facility] Access Code: [email or web address]  Expires: 7/3/2017 12:32 PM    If you have questions, you can ca

## (undated) NOTE — Clinical Note
05/09/2017    To: Margarita Forbes    Re: Test Results      Thank you for your recent visit to our office. We have received your test results, which were done on   05/04/2017.      PAP Test:    Your results are normal.        HPV (Human papillomavirus) Yaz

## (undated) NOTE — LETTER
08/08/2024    Melinda Mejia  [address] [facility] [address]    Percy Melinda,     I hope this letter finds you well.  I have made several attempts to reach you via phone call and my chart message in regards to the breast biopsy.  The breast biopsy is very important to provide an accurate diagnosis.  If you are concerned or have more questions, I invite you to call or schedule a visit with your provider to discuss further.  If you have decided you do not wish to do the biopsy, please let us know.        Sincerely,         Virgen Medina MS, BSN, RN